# Patient Record
Sex: FEMALE | Race: BLACK OR AFRICAN AMERICAN | NOT HISPANIC OR LATINO | Employment: FULL TIME | URBAN - METROPOLITAN AREA
[De-identification: names, ages, dates, MRNs, and addresses within clinical notes are randomized per-mention and may not be internally consistent; named-entity substitution may affect disease eponyms.]

---

## 2024-01-14 ENCOUNTER — APPOINTMENT (OUTPATIENT)
Dept: GENERAL RADIOLOGY | Facility: HOSPITAL | Age: 54
End: 2024-01-14
Payer: COMMERCIAL

## 2024-01-14 ENCOUNTER — APPOINTMENT (OUTPATIENT)
Dept: CT IMAGING | Facility: HOSPITAL | Age: 54
End: 2024-01-14
Payer: COMMERCIAL

## 2024-01-14 ENCOUNTER — HOSPITAL ENCOUNTER (EMERGENCY)
Facility: HOSPITAL | Age: 54
Discharge: HOME | End: 2024-01-14
Attending: EMERGENCY MEDICINE | Admitting: EMERGENCY MEDICINE
Payer: COMMERCIAL

## 2024-01-14 VITALS
BODY MASS INDEX: 26.63 KG/M2 | HEART RATE: 78 BPM | OXYGEN SATURATION: 100 % | RESPIRATION RATE: 16 BRPM | TEMPERATURE: 98.1 F | SYSTOLIC BLOOD PRESSURE: 144 MMHG | WEIGHT: 159.83 LBS | HEIGHT: 65 IN | DIASTOLIC BLOOD PRESSURE: 79 MMHG

## 2024-01-14 DIAGNOSIS — G44.201 ACUTE INTRACTABLE TENSION-TYPE HEADACHE: ICD-10-CM

## 2024-01-14 DIAGNOSIS — J01.10 ACUTE FRONTAL SINUSITIS, RECURRENCE NOT SPECIFIED: Primary | ICD-10-CM

## 2024-01-14 PROCEDURE — 99284 EMERGENCY DEPT VISIT MOD MDM: CPT

## 2024-01-14 PROCEDURE — 71045 X-RAY EXAM CHEST 1 VIEW: CPT

## 2024-01-14 PROCEDURE — 70450 CT HEAD/BRAIN W/O DYE: CPT

## 2024-01-14 RX ORDER — AZITHROMYCIN 250 MG/1
250 TABLET, FILM COATED ORAL DAILY
Qty: 6 TABLET | Refills: 0 | Status: SHIPPED | OUTPATIENT
Start: 2024-01-14 | End: 2024-01-19

## 2024-01-14 NOTE — ED PROVIDER NOTES
Subjective   History of Present Illness  Patient is a pleasant 53-year-old -American female with a history of sinusitis who presents the emergency room with complaints of posterior headache and sinus pressure that has been ongoing for the past week.  She states that symptoms started after hitting her head on her refrigerator but is unsure whether this is just coincidental but wanted evaluated nonetheless.  She has had no significant dizziness, nausea, vomiting or shortness of breath.  She also has some tenderness on her chest that is reproducible with movement.  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  She denies use of drugs, alcohol tobacco.      Review of Systems   Constitutional:  Negative for appetite change and fever.   HENT:  Positive for congestion, sinus pressure and sinus pain. Negative for sore throat.    Respiratory:  Negative for chest tightness and shortness of breath.    Cardiovascular:  Negative for palpitations.   Genitourinary:  Negative for dysuria.   Musculoskeletal:  Positive for arthralgias.   Neurological:  Positive for headaches. Negative for syncope.   All other systems reviewed and are negative.      No past medical history on file.    Allergies   Allergen Reactions    Ceftriaxone Hives and Rash    Egg Yolk Anaphylaxis    Orange Anaphylaxis    Peanut Oil Anaphylaxis    Beef (Bovine) Protein Hives    Chocolate Flavor Hives    Clindamycin Swelling    Corn Oil Hives    Dexamethasone Hives    Fish Allergy Hives       No past surgical history on file.    No family history on file.    Social History     Socioeconomic History    Marital status: Single           Objective   Physical Exam  Vitals and nursing note reviewed.   Constitutional:       General: She is not in acute distress.     Appearance: Normal appearance. She is not ill-appearing.   HENT:      Head: Normocephalic and atraumatic.      Nose: Congestion present.   Cardiovascular:      Rate and Rhythm: Normal rate and regular  "rhythm.      Heart sounds: Normal heart sounds. No murmur heard.  Pulmonary:      Effort: Pulmonary effort is normal. No respiratory distress.      Breath sounds: Normal breath sounds.   Abdominal:      General: Bowel sounds are normal.   Musculoskeletal:         General: No tenderness. Normal range of motion.   Neurological:      Mental Status: She is alert.         Procedures           ED Course      /79 (BP Location: Left arm, Patient Position: Sitting)   Pulse 78   Temp 98.1 °F (36.7 °C) (Oral)   Resp 16   Ht 165.1 cm (65\")   Wt 72.5 kg (159 lb 13.3 oz)   SpO2 100%   BMI 26.60 kg/m²   Labs Reviewed - No data to display  Medications - No data to display  CT Head Without Contrast    Result Date: 1/14/2024  Impression: No acute intracranial pathology. Electronically Signed: Hemant Donnelly MD  1/14/2024 2:44 PM EST  Workstation ID: [id number]    XR Chest 1 View    Result Date: 1/14/2024  1.No evidence for acute cardiopulmonary process. Electronically Signed: Taran Gamboa MD  1/14/2024 2:40 PM EST  Workstation ID: [id number]                                          Medical Decision Making  Amount and/or Complexity of Data Reviewed  Radiology: ordered. Decision-making details documented in ED Course.    Patient is a 53-year-old -American female with a history of sinusitis who presents the emergency room with complaints of sinus pain and pressure with headache that has been ongoing since hitting her head on refrigerator about a week ago.  On exam, patient head is normocephalic and atraumatic with no raccoon eyes or Santos sign.  Pupils PERRLA patient is able to answer questions appropriately.  Some pressure on palpation along her frontal sinuses with no cervical of adenopathy.  Normal S1/S2 without clicks murmurs.  No JVD.  Lungs are clear to auscultation in all fields.  Chest is nontender.  Initial differentials include sinusitis, musculoskeletal strain, migraine, brain mass.  This is not a complete " list.    Due to overwhelming hospital census and boarding inpatients in the emergency room, patient received above examination in hallway bed.  Examination was made to the best of provider's ability with respect to patient privacy and confidentiality.  Labs were considered but not completed at this time because I did not believe they would impact her plan of care.  My interpretation of CT reveals no lesion, midline shift or hemorrhage.  Her chest x-ray is clear of pneumothorax, nodules or infiltrates.  Upon reassessment, patient continues to deny chest pain and shortness of breath.  She has been afebrile.  Results were discussed with the patient, who is concerned for sinus infection and states that she gets 1 1-2 times a year around this time.  Given the longevity of her symptoms patient will be given a prescription of Zithromax with instruction to follow-up to her primary care provider for further evaluation.  She was also given neurology information if headache persists.  She verbalized understanding is agreeable to plan of care.  Patient is able to ambulate upright steadily without assistance upon discharge.  No acute distress noted.    I discussed the findings with patient who voices understanding of discharge instructions, signs and symptoms requiring return to the ED; discharged improved and stable condition with follow-up for reevaluation.    Patient is aware that discharge does not mean that nothing is wrong but it indicates no emergency is present and they must continue care with follow-up as given below or physician of their choice.    This document is intended for medical expert use only.  Reading of this document by patients and/or patient's family without participating medical staff guidance may result in misinterpretation and unintended morbidity.  Any interpretation of such data is the responsibility of the patient and/or family member responsible for the patient in concert with their primary or  specialist providers, not to be left for sources of online search as such as [employer], [employer] or similar queries.  Relying on these approaches to knowledge may result in misinterpretation, misguided goals of care and even death should patient or family members try recommendations outside of the realm of professional medical care in a supervised inpatient environment.    This medical document was created using Dragon dictation system. Some errors in speech recognition may occur.    Final diagnoses:   Acute frontal sinusitis, recurrence not specified   Acute intractable tension-type headache       ED Disposition  ED Disposition       ED Disposition   Discharge    Condition   Stable    Comment   --               Seipel, Joseph F, MD  [facility] [place] [address] [phone]          [facility] - [place] [address]  [phone]             Medication List        New Prescriptions      azithromycin 250 MG tablet  Commonly known as: ZITHROMAX  Take 1 tablet by mouth Daily for 5 days. Take 2 tablets on the first day and 1 tablet for the following 4 days for a total of 5-day treatment.  Finish the entire prescription.               Where to Get Your Medications        These medications were sent to [facility]/pharmacy #[id number] - [facility] [place] - [address] [place] [address] - [phone]  - [phone] [address] [place] [address]      Hours: 24-hours Phone: [phone]   azithromycin 250 MG tablet            Tania Strickland, APRN  01/14/24 4735

## 2024-01-14 NOTE — DISCHARGE INSTRUCTIONS
Take antibiotics as directed and be sure to finish the entire course.  Alternate use of ibuprofen and Tylenol as needed for headache or fever.  Rest.  Increase your fluid intake and avoid dehydrating compounds.    Follow-up with neurology if your symptoms persist.  Follow up with your primary care provider as needed. If you do not have a primary care provider, contact [facility] to establish one. Information has been provided to you in this document.     Return to the ER for new or worsening symptoms.